# Patient Record
Sex: MALE | Race: BLACK OR AFRICAN AMERICAN | URBAN - METROPOLITAN AREA
[De-identification: names, ages, dates, MRNs, and addresses within clinical notes are randomized per-mention and may not be internally consistent; named-entity substitution may affect disease eponyms.]

---

## 2024-01-02 ENCOUNTER — TELEPHONE (OUTPATIENT)
Age: 29
End: 2024-01-02

## 2024-01-02 NOTE — TELEPHONE ENCOUNTER
Patient came in because he need to get an authorization for his EDARBYCLOR 40-25 MG patient said that he is completely out and has been out for 2 weeks patient stated that he did speak to a nurse about this situation and he said no one has reached back out.